# Patient Record
Sex: FEMALE | Race: WHITE | HISPANIC OR LATINO | URBAN - NONMETROPOLITAN AREA
[De-identification: names, ages, dates, MRNs, and addresses within clinical notes are randomized per-mention and may not be internally consistent; named-entity substitution may affect disease eponyms.]

---

## 2018-04-02 ENCOUNTER — FOLLOW UP ESTABLISHED (OUTPATIENT)
Dept: URBAN - NONMETROPOLITAN AREA CLINIC 6 | Facility: CLINIC | Age: 23
End: 2018-04-02
Payer: COMMERCIAL

## 2018-04-02 PROCEDURE — 92015 DETERMINE REFRACTIVE STATE: CPT | Performed by: OPTOMETRIST

## 2018-04-02 PROCEDURE — 92014 COMPRE OPH EXAM EST PT 1/>: CPT | Performed by: OPTOMETRIST

## 2018-04-02 ASSESSMENT — INTRAOCULAR PRESSURE
OS: 19
OD: 22

## 2018-04-02 ASSESSMENT — VISUAL ACUITY
OD: 20/25
OS: 20/30

## 2021-07-21 ENCOUNTER — OFFICE VISIT (OUTPATIENT)
Dept: URBAN - NONMETROPOLITAN AREA CLINIC 6 | Facility: CLINIC | Age: 26
End: 2021-07-21
Payer: COMMERCIAL

## 2021-07-21 DIAGNOSIS — H52.13 MYOPIA, BILATERAL: ICD-10-CM

## 2021-07-21 DIAGNOSIS — H31.011 MACULA SCARS OF POSTERIOR POLE (POSTINFLAMMATORY) (POST-TRAUMATIC), RIGHT EYE: Primary | ICD-10-CM

## 2021-07-21 PROCEDURE — 99204 OFFICE O/P NEW MOD 45 MIN: CPT | Performed by: OPTOMETRIST

## 2021-07-21 ASSESSMENT — VISUAL ACUITY
OD: 20/20
OS: 20/20

## 2021-07-21 ASSESSMENT — INTRAOCULAR PRESSURE
OS: 18
OD: 20

## 2021-07-21 NOTE — IMPRESSION/PLAN
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 Plan: Patient education regarding findings. No treatment recommended at this time. Continue to monitor annually.

## 2021-08-02 ENCOUNTER — TESTING ONLY (OUTPATIENT)
Dept: URBAN - NONMETROPOLITAN AREA CLINIC 6 | Facility: CLINIC | Age: 26
End: 2021-08-02

## 2021-08-02 PROCEDURE — 92310 CONTACT LENS FITTING OU: CPT | Performed by: OPTOMETRIST

## 2021-08-25 ENCOUNTER — OFFICE VISIT (OUTPATIENT)
Dept: URBAN - NONMETROPOLITAN AREA CLINIC 6 | Facility: CLINIC | Age: 26
End: 2021-08-25

## 2021-08-25 PROCEDURE — 92310 CONTACT LENS FITTING OU: CPT | Performed by: OPTOMETRIST

## 2024-01-02 ENCOUNTER — OFFICE VISIT (OUTPATIENT)
Dept: URBAN - NONMETROPOLITAN AREA CLINIC 6 | Facility: CLINIC | Age: 29
End: 2024-01-02
Payer: COMMERCIAL

## 2024-01-02 DIAGNOSIS — H52.13 MYOPIA, BILATERAL: Primary | ICD-10-CM

## 2024-01-02 PROCEDURE — 92014 COMPRE OPH EXAM EST PT 1/>: CPT | Performed by: OPTOMETRIST

## 2024-01-02 PROCEDURE — 92310 CONTACT LENS FITTING OU: CPT | Performed by: OPTOMETRIST

## 2024-01-02 ASSESSMENT — INTRAOCULAR PRESSURE
OD: 19
OS: 19